# Patient Record
Sex: MALE | Race: WHITE | URBAN - METROPOLITAN AREA
[De-identification: names, ages, dates, MRNs, and addresses within clinical notes are randomized per-mention and may not be internally consistent; named-entity substitution may affect disease eponyms.]

---

## 2018-12-04 ENCOUNTER — OFFICE VISIT (OUTPATIENT)
Dept: SURGERY | Facility: CLINIC | Age: 23
End: 2018-12-04
Payer: COMMERCIAL

## 2018-12-04 VITALS
HEART RATE: 78 BPM | DIASTOLIC BLOOD PRESSURE: 77 MMHG | HEIGHT: 73 IN | SYSTOLIC BLOOD PRESSURE: 134 MMHG | WEIGHT: 186 LBS | BODY MASS INDEX: 24.65 KG/M2 | RESPIRATION RATE: 16 BRPM

## 2018-12-04 DIAGNOSIS — K40.90 RIGHT INGUINAL HERNIA: Primary | ICD-10-CM

## 2018-12-04 NOTE — H&P
New Patient Visit Note       Active Problems      1.  Right inguinal hernia        Chief Complaint   Patient presents with:  Consult: ABHIJEET consult, patient c/o hernia in lower right side of groin, denies any pain      History of Present Illness           Ple myalgias. Skin: Negative for color change and rash. Neurological: Negative for tremors, syncope and weakness. Hematological: Negative for adenopathy. Does not bruise/bleed easily.    Psychiatric/Behavioral: Negative for behavioral problems and sleep d